# Patient Record
Sex: FEMALE | Race: BLACK OR AFRICAN AMERICAN | Employment: FULL TIME | URBAN - METROPOLITAN AREA
[De-identification: names, ages, dates, MRNs, and addresses within clinical notes are randomized per-mention and may not be internally consistent; named-entity substitution may affect disease eponyms.]

---

## 2019-09-24 ENCOUNTER — HOSPITAL ENCOUNTER (OUTPATIENT)
Dept: PHYSICAL THERAPY | Age: 59
Discharge: HOME | End: 2019-09-24
Payer: COMMERCIAL

## 2019-09-24 PROCEDURE — 97162 PT EVAL MOD COMPLEX 30 MIN: CPT

## 2019-09-24 PROCEDURE — 97110 THERAPEUTIC EXERCISES: CPT

## 2019-09-24 NOTE — PROGRESS NOTES
[address] PHYSICAL THERAPY AT [address], [address] [place] [address] Road  Phone: [phone]   [phone]  PLAN OF CARE / [address] [place] [address] PHYSICAL THERAPY SERVICES  Patient Name: Megan Story : 1960   Medical   Diagnosis: R knee pain [M25.561] Treatment Diagnosis: Knee pain, right [M25.561]   Onset Date: May 2019     Referral Source: Galilea Salgado MD Start of [facility]): 2019   Prior Hospitalization: See medical history Provider #: [id number]   Prior Level of Function: Previously able to walk w/o AD   Comorbidities: OA, BMI > 30, PVD, Sleep apnea   Medications: Verified on Patient Summary List   The Plan of Care and following information is based on the information from the initial evaluation.   ===========================================================================================  Assessment / key information:  Pt is a 64 y/o F who presents to PT w/ c/o R knee pain that began in May 2019 after pulling weeds from her garden. Pt is unsure of IVAN but feels she may have hyperextended her knee. Reports later that evening she was unable to stand up from sitting due to pain. Pt went to PCP 3 days later where x-rays showed mild OA and she was advised to take Aleve. Pt notes she was referred to PT at that time but did not attend. Reports since then, her pain has changed, c/o instability and a few instances of knee giving way. Notes dec stiffness. Pt currently ambulating w/ SPC L, reports standing tolerance of 5 minutes and walking tolerance of 15 minutes. Denies red flags. Clinical exam significant for:  OBSERVATION: Pt ambulating w/ antalgic gait w/ dec step length R. Utilizing SPC. Negotiates 4 steps w/ B HR, NR to ascend and descend. ROM: R knee AROM 8-90 (L knee 10-0-94). PROM knee flex 101 (soft tissue approximation limiting further ROM). STRENGTH: Poor quad set R (L fair+).  Knee flex 4-/5 R (4/5 L), ext R 4-/5 (L 4/5). Hip abd R 3-/5, L 3/5, hip ext 3-/5 B. PALPATION: TTP to lateral joint line and LCL, as well as patellar tendon. SPECIAL TEST: (-)varus/valgus stress testing for laxity, (+) valgus for pain at lateral joint line. (-) posterior drawer. Nataliia (-) within allowable ROM and ability to maintain LE in testing position by PT. (+) patellar compression test. FOTO 30/100. Pt presents w/ sx consistent w/ PFPS, however complaints of instability and instances of buckling do raise concern. Unfortunately, ligamentous/meniscal testing may be unreliable at this time due to difficulty performing special tests on patient. Further assessment may be warranted if PT fails to resolve symptoms.   ===========================================================================================  Eval Complexity: History MEDIUM  Complexity : 1-2 comorbidities / personal factors will impact the outcome/ POC ;  Examination  MEDIUM Complexity : 3 Standardized tests and measures addressing body structure, function, activity limitation and / or participation in recreation ; Presentation MEDIUM Complexity : Evolving with changing characteristics ;   Decision Making MEDIUM Complexity : FOTO score of 26-74; Overall Complexity MEDIUM  Problem List: pain affecting function, decrease ROM, decrease strength, impaired gait/ balance, decrease ADL/ functional abilitiies, decrease activity tolerance, decrease flexibility/ joint mobility and decrease transfer abilities   Treatment Plan may include any combination of the following: Therapeutic exercise, Therapeutic activities, Neuromuscular re-education, Physical agent/modality, Gait/balance training, Manual therapy, Patient education, Self Care training, Functional mobility training, Home safety training and Stair training  Patient / Family readiness to learn indicated by: asking questions, trying to perform skills and interest  Persons(s) to be included in education: patient (P)  Barriers to Learning/Limitations: None  Measures taken, if barriers to learning:    Patient Goal (s): \"strengthen knee and reduce or eliminate pain\"   Patient self reported health status: good  Rehabilitation Potential: fair   Short Term Goals: To be accomplished in  2  weeks:  1. Pt will be I and compliant w/ HEP for self management of sx  2. Pt will demo symmetrical step length w/ normalized gait mechanics to amb w/o AD   Long Term Goals: To be accomplished in  4-6  weeks:  1. Pt will ascend/descend 1 flight of steps w/ reciprocal pattern to improve household accessibility  2. Improve R quad/HS/glute strength by at least 1/3 grade to improve standing/walking tolerance    Frequency / Duration:   Patient to be seen  2  times per week for 4-6  weeks:  Patient / Caregiver education and instruction: exercises  Therapist Signature: Jesica Pereira PT Date: 0/96/2805   Certification Period: na Time: 4:10 PM   ===========================================================================================  I certify that the above Physical Therapy Services are being furnished while the patient is under my care. I agree with the treatment plan and certify that this therapy is necessary. Physician Signature:        Date:       Time:     Please sign and return to [facility] UNIT or you may fax the signed copy to [phone]. Thank you.

## 2019-09-24 NOTE — PROGRESS NOTES
PHYSICAL THERAPY - DAILY TREATMENT NOTE    Patient Name: Talisha Yates        Date: 2019  : 1960   yes Patient  Verified  Visit #:   1     Insurance: Payor: Myles Martins / Plan: Neeta Mcfadden 77 HMO / Product Type: HMO /      In time: 2:10 Out time: 2:50   Total Treatment Time: 40     Medicare/[facility] Time Tracking (below)   Total Timed Codes (min):  na 1:1 Treatment Time:  na     TREATMENT AREA =  Knee pain, right [M25.561]    SUBJECTIVE  Pain Level (on 0 to 10 scale):  3   10   Medication Changes/New allergies or changes in medical history, any new surgeries or procedures?    no  If yes, update Summary List   Subjective Functional Status/Changes:  []  No changes reported     See POC          OBJECTIVE      10 min Therapeutic Exercise:  [x]  See flow sheet   Rationale:      increase ROM and increase strength to improve the patients ability to ambulate     Billed With/As:   [x] TE   [] TA   [] Neuro   [] Self Care Patient Education: [x] Review HEP    [] Progressed/Changed HEP based on:   [] positioning   [] body mechanics   [] transfers   [] heat/ice application    [] other:      Other Objective/Functional Measures:    See POC     Post Treatment Pain Level (on 0 to 10) scale:   3   10     ASSESSMENT  Assessment/Changes in Function:     See POC     []  See Progress Note/Recertification   Patient will continue to benefit from skilled PT services to modify and progress therapeutic interventions, address functional mobility deficits, address ROM deficits, address strength deficits, analyze and address soft tissue restrictions, analyze and cue movement patterns, analyze and modify body mechanics/ergonomics, assess and modify postural abnormalities, address imbalance/dizziness and instruct in home and community integration to attain remaining goals.    Progress toward goals / Updated goals:  See POC     PLAN  []  Upgrade activities as tolerated yes Continue plan of care   [] Discharge due to :    []  Other:      Therapist: Genny Longoria PT    Date: 9/24/2019 Time: 4:08 PM     Future Appointments   Date Time Provider Matt Ambar   10/7/2019  5:15 PM Margilda Range, PTA [facility]   10/10/2019  4:45 PM Chito Lima., PT [facility]   10/14/2019  4:30 PM Bishop Adam, PTA [facility]   10/16/2019  3:45 PM Cindy Beaver, PT [facility]   10/22/2019  3:30 PM Chito Lima., PT [facility]   10/24/2019  4:45 PM Chito Swain, PT [facility]   10/28/2019  4:15 PM Alba Kelly, PT [facility]   10/31/2019  4:45 PM Chito Lima., PT [facility]

## 2019-10-07 ENCOUNTER — APPOINTMENT (OUTPATIENT)
Dept: PHYSICAL THERAPY | Age: 59
End: 2019-10-07
Payer: COMMERCIAL

## 2019-10-10 ENCOUNTER — APPOINTMENT (OUTPATIENT)
Dept: PHYSICAL THERAPY | Age: 59
End: 2019-10-10
Payer: COMMERCIAL

## 2019-10-14 ENCOUNTER — HOSPITAL ENCOUNTER (OUTPATIENT)
Dept: PHYSICAL THERAPY | Age: 59
Discharge: HOME | End: 2019-10-14
Payer: COMMERCIAL

## 2019-10-14 PROCEDURE — 97110 THERAPEUTIC EXERCISES: CPT

## 2019-10-14 NOTE — PROGRESS NOTES
PT DAILY TREATMENT NOTE     Patient Name: Victorino Figueredo  Date:10/14/2019  : 1960  [x]  Patient  Verified  Payor: Carlos Godwin / Plan: Neeta Mcfadden 77 HMO / Product Type: HMO /    In time:4:34  Out time:5:28  Total Treatment Time (min): 54  Total Timed Codes (min): 39  1:1 Treatment Time (min):    Visit #: 2 of     Treatment Area: Knee pain, right [M25.561]    SUBJECTIVE  Pain Level (0-10 scale): 4/10  Any medication changes, allergies to medications, adverse drug reactions, diagnosis change, or new procedure performed?: [x] No    [] Yes (see summary sheet for update)  Subjective functional status/changes:   [] No changes reported  I feel most of the pain on the outside of my knee going down to my shin especially if I do any amount of standing and it even wakes me up at night trying to find a position to get comfortable in.     OBJECTIVE  Modality rationale: decrease inflammation and decrease pain to improve the patients ability to improve functional abilities   Min Type Additional Details    [] Estim: []Att   []Unatt  []TENS instruct                 []IFC  []Premod []NMES                       []Other:  []w/US   []w/ice   []w/heat  Position:  Location:    []  Traction: [] Cervical       []Lumbar                       [] Prone          []Supine                       []Intermittent   []Continuous Lbs:  [] before manual  [] after manual    []  Ultrasound: []Continuous   [] Pulsed                           []1MHz   []3MHz Location:  W/cm2:    []  Iontophoresis with dexamethasone         Location: [] Take home patch   [] In clinic   10 [x]  Ice     []  heat  []  Ice massage Position:reclined  Location:right knee     []  Vasopneumatic Device Pressure: [] lo [] med [] hi   Temp: [] lo [] med [] hi   [] Skin assessment post-treatment:  []intact []redness- no adverse reaction       []redness - adverse reaction:       44 min Therapeutic Exercise:  [x] See flow sheet :   Rationale: increase ROM, increase strength, improve balance and increase proprioception to improve the patients ability to improve functional abilities            min Patient Education: [x] Review HEP    [] Progressed/Changed HEP based on:   [] positioning   [] body mechanics   [] transfers   [] heat/ice application        Other Objective/Functional Measures:     Pain Level (0-10 scale) post treatment: 3/10    ASSESSMENT/Changes in Function: Pt tolerated all new therex fairly well upon trial today with chief c/o lateral knee and distal LE pain with prolonged standing as well as with static positioning especially at night that interrupts her sleep. Pt needs the most focus on quadriceps strengthening and knee stabilization. Will continue to progress/advance patient within current POC as tolerated with monitoring symptoms. Patient will continue to benefit from skilled PT services to modify and progress therapeutic interventions, address functional mobility deficits, address ROM deficits, address strength deficits and analyze and cue movement patterns to attain remaining goals. []  See Plan of Care  []  See progress note/recertification  []  See Discharge Summary         Progress towards goals / Updated goals: · Short Term Goals: To be accomplished in  2  weeks:  1. Pt will be I and compliant w/ HEP for self management of sx  2. Pt will demo symmetrical step length w/ normalized gait mechanics to amb w/o AD  · Long Term Goals: To be accomplished in  4-6  weeks:  1. Pt will ascend/descend 1 flight of steps w/ reciprocal pattern to improve household accessibility  2.  Improve R quad/HS/glute strength by at least 1/3 grade to improve standing/walking tolerance       PLAN  [x]  Upgrade activities as tolerated     []  Continue plan of care  []  Update interventions per flow sheet       []  Discharge due to:_  []  Other:_      Demarcus Beckman, PTA 10/14/2019  5:14 PM      Future Appointments   Date Time Provider Matt Villalta 10/16/2019  3:45 PM Rudy Beaver, PT [facility]   10/22/2019  3:30 PM Brenda Beard, PT [facility]   10/24/2019  4:45 PM Brenda Beard, PT [facility]   10/28/2019  4:15 PM Debbie Christine, PT [facility]   10/31/2019  4:45 PM Brenda Beard, PT [facility]

## 2019-10-16 ENCOUNTER — HOSPITAL ENCOUNTER (OUTPATIENT)
Dept: PHYSICAL THERAPY | Age: 59
Discharge: HOME | End: 2019-10-16
Payer: COMMERCIAL

## 2019-10-16 PROCEDURE — 97110 THERAPEUTIC EXERCISES: CPT

## 2019-10-16 NOTE — PROGRESS NOTES
PHYSICAL THERAPY - DAILY TREATMENT NOTE    Patient Name: Radha Griggs        Date: 10/16/2019  : 1960   yes Patient  Verified  Visit #:   3     Ins[facility]: [facility] / Plan: Neeta Mcfadden 77 HMO / Product Type: HMO /      In time: 345 Out time: 4:19   Total Treatment Time: 34     Medicare/[employer] Time Tracking (below)   Total Timed Codes (min):  na 1:1 Treatment Time:  na     TREATMENT AREA =  Knee pain, right [M25.561]    SUBJECTIVE  Pain Level (on 0 to 10 scale):  4  / 10   Medication Changes/New allergies or changes in medical history, any new surgeries or procedures?    no  If yes, update Summary List   Subjective Functional Status/Changes:  []  No changes reported     Pt reports no significant soreness after last visit. \"I feel like my knee is getting a little better\".           OBJECTIVE  Modalities Rationale:     n/a to improve patient's ability to n/a- PD CP   min [] Estim, type/location:                                      []  att     []  unatt     []  w/US     []  w/ice    []  w/heat    min []  Mechanical Traction: type/lbs                   []  pro   []  sup   []  int   []  cont    []  before manual    []  after manual    min []  Ultrasound, settings/location:      min []  Iontophoresis w/ dexamethasone, location:                                               []  take home patch       []  in clinic    min []  Ice     []  Heat    location/position:     min []  Vasopneumatic Device, press/temp:     min []  Other:    [] Skin assessment post-treatment (if applicable):    []  intact    []  redness- no adverse reaction     []redness - adverse reaction:         34 min Therapeutic Exercise:  [x]  See flow sheet   Rationale:      increase ROM, increase strength, improve balance and increase proprioception to improve the patients ability to negotiate stairs, tolerate prolonged walking     Billed With/As:   [x] TE   [] TA   [] Neuro   [] Self Care Patient Education: [x] Review HEP    [] Progressed/Changed HEP based on:   [] positioning   [] body mechanics   [] transfers   [] heat/ice application    [] other:      Other Objective/Functional Measures:    Cueing to reduce genu valgum angulation w/ lateral step ups  Pt c/o lateral knee pain w/ quad sets w/ towel roll under knee  SLR flex to mid-range 2' fatigue     Post Treatment Pain Level (on 0 to 10) scale:   2  / 10     ASSESSMENT  Assessment/Changes in Function:     Pt reported dec pain post tx session. Cont to progress exercise as tolerated to improve stabilization to the R knee     []  See Progress Note/Recertification   Patient will continue to benefit from skilled PT services to modify and progress therapeutic interventions, address functional mobility deficits, address ROM deficits, address strength deficits, analyze and address soft tissue restrictions, analyze and cue movement patterns, analyze and modify body mechanics/ergonomics, assess and modify postural abnormalities and instruct in home and community integration to attain remaining goals. Progress toward goals / Updated goals: · Short Term Goals: To be accomplished in  2  weeks:  1. Pt will be I and compliant w/ HEP for self management of sx 10/16/19: goal met, pt reports compliance  2. Pt will demo symmetrical step length w/ normalized gait mechanics to amb w/o AD  · Long Term Goals: To be accomplished in  4-6  weeks:  1. Pt will ascend/descend 1 flight of steps w/ reciprocal pattern to improve household accessibility  2.  Improve R quad/HS/glute strength by at least 1/3 grade to improve standing/walking tolerance     PLAN  []  Upgrade activities as tolerated yes Continue plan of care   []  Discharge due to :    []  Other:      Therapist: Lili Foster PT    Date: 10/16/2019 Time: 4:17 PM     Future Appointments   Date Time Provider Matt Villalta   10/22/2019  6:00 PM Talib Mack, PT [facility]   10/24/2019  5:30 PM Talib Mack, PT [facility]   10/28/2019  4:15 PM Zachery Naik, PT [facility]   10/31/2019  4:45 PM Lalo Akins, PT [facility]

## 2019-10-22 ENCOUNTER — HOSPITAL ENCOUNTER (OUTPATIENT)
Dept: PHYSICAL THERAPY | Age: 59
Discharge: HOME | End: 2019-10-22
Payer: COMMERCIAL

## 2019-10-22 PROCEDURE — 97110 THERAPEUTIC EXERCISES: CPT

## 2019-10-22 NOTE — PROGRESS NOTES
[address] [place] [address] PHYSICAL THERAPY  [address], [place], [address] - Phone: [phone]  Fax: [phone]  PROGRESS NOTE  Patient Name: Jon Medel : 1960   Treatment/Medical Diagnosis: Knee pain, right [M25.561]   Referral Source: Padilla Dhillon MD     Date of Initial Visit: 19 Attended Visits: 4 Missed Visits: 0     SUMMARY OF TREATMENT  Physical therapy has consisted of therapeutic exercises for increased RLE strength, ROM, and flexibility. Ice provided PRN for palliative. Pt education provided regarding HEP. CURRENT STATUS  Pt rates overall improvement as 30-40% with functional activities since [facility]. Pt demonstrates significant objective improvement to knee strength as per increased knee ext by 4/3 MMT. Hamstrings increased 2/3 MMT from [facility]. Hip abd increased 2/3 MMT. Further progress has been somewhat limited by decreased attendance in PT sessions, only attending 4 sessions in 4 weeks (reports she was sick first 2 weeks). Current improvements: \"I feel like my knee is stronger now\". Current objective impairments:    STRENGTH:  Hip flex R 4/5, L 4/5; ext fair as per limited hip clearance with bridge; Hip abd R 3+/5. Knee Ext R 5/5, L 5/5; flex R 4+/5, L 4+/5. Right quad set fair-; submax due to pain c/o. AROM/PROM: -14 to 93/95 (p!)  Current functional limitations:  Stairs (crawls to ascend, leans on wall reciprocal descent per pt report at home), stand 5 min tolerance, walk 10 min tolerance. Goal/Measure of Progress Goal Met? 1. Pt will be I and compliant w/ HEP for self management of sx. Status at last Eval:  Current Status: Non-compliant; performs ~ 1 x/wk no   2. Pt will demo symmetrical step length w/ normalized gait mechanics to amb w/o AD   Status at last Eval: Pt ambulating w/ antalgic gait w/ dec step length R. Utilizing SPC.   Current Status: Antalgic gait pattern, decreased R step length with SPC no     New Goals to be achieved in __4__  weeks:  1. Pt will ascend/descend 1 flight of steps w/ reciprocal pattern to improve household accessibility  2. Pt will demo symmetrical step length w/ normalized gait mechanics to amb w/o AD  3. Pt will be independent/compliant with HEP to promote improved walking tolerance. RECOMMENDATIONS  Pt will benefit from skilled progression of PT at 2 x/wk for additional 4 weeks to attain LTG's. If you have any questions/comments please contact us directly at [phone]. Thank you for allowing us to assist in the care of your patient. Therapist Signature: Martinez Stapleton, PT Date: 10/22/2019     Time: 5:49 PM   NOTE TO PHYSICIAN:  PLEASE COMPLETE THE ORDERS BELOW AND FAX TO   [facility]: [phone]  If you are unable to process this request in 24 hours please contact our office: [phone]    ___ I have read the above report and request that my patient continue as recommended.   ___ I have read the above report and request that my patient continue therapy with the following changes/special instructions:_________________________________________________________   ___ I have read the above report and request that my patient be discharged from therapy.      Physician Signature:        Date:       Time:

## 2019-10-22 NOTE — PROGRESS NOTES
PHYSICAL THERAPY - DAILY TREATMENT NOTE    Patient Name: Valentina Calhoun        Date: 10/22/2019  : 1960   yes Patient  Verified  Visit #:   4     Insurance: Payor: [facility] / Plan: Neeta Mcfadden 77 HMO / Product Type: HMO /      In time: 5:38 Out time: 6:33   Total Treatment Time: 55     Medicare/[facility] Time Tracking (below)   Total Timed Codes (min):  na 1:1 Treatment Time:  na     TREATMENT AREA =  Knee pain, right [M25.561]    SUBJECTIVE  Pain Level (on 0 to 10 scale):  1  / 10   Medication Changes/New allergies or changes in medical history, any new surgeries or procedures?    no  If yes, update Summary List   Subjective Functional Status/Changes:  []  No changes reported     See PN       OBJECTIVE  Modalities Rationale:     n/a to improve patient's ability to n/a- PD CP   min [] Estim, type/location:                                      []  att     []  unatt     []  w/US     []  w/ice    []  w/heat    min []  Mechanical Traction: type/lbs                   []  pro   []  sup   []  int   []  cont    []  before manual    []  after manual    min []  Ultrasound, settings/location:      min []  Iontophoresis w/ dexamethasone, location:                                               []  take home patch       []  in clinic   10 min [x]  Ice     []  Heat    location/position: R knee; reclined sitting    min []  Vasopneumatic Device, press/temp:     min []  Other:    [] Skin assessment post-treatment (if applicable):    []  intact    []  redness- no adverse reaction     []redness - adverse reaction:         40 min Therapeutic Exercise:  [x]  See flow sheet   Rationale:      increase ROM, increase strength, improve balance and increase proprioception to improve the patients ability to negotiate stairs, tolerate prolonged walking     Billed With/As:   [x] TE   [] TA   [] Neuro   [] Self Care Patient Education: [x] Review HEP    [] Progressed/Changed HEP based on:   [] positioning [] body mechanics   [] transfers   [] heat/ice application    [] other:      Other Objective/Functional Measures:    -progressed to 2 lb LAQ  -attempted manual stretch R quad in L s/l however pt c/o pain   Post Treatment Pain Level (on 0 to 10) scale:   2  / 10     ASSESSMENT  Assessment/Changes in Function:     See PN     []  See Progress Note/Recertification   Patient will continue to benefit from skilled PT services to modify and progress therapeutic interventions, address functional mobility deficits, address ROM deficits, address strength deficits, analyze and address soft tissue restrictions, analyze and cue movement patterns, analyze and modify body mechanics/ergonomics, assess and modify postural abnormalities and instruct in home and community integration to attain remaining goals. Progress toward goals / Updated goals:  See pn     PLAN  []  Upgrade activities as tolerated yes Continue plan of care   []  Discharge due to :    []  Other:      Therapist: Shayna Bright.  Philomena Awad, PT    Date: 10/22/2019 Time: 7:15 PM      Future Appointments   Date Time Provider Matt Villalta   10/22/2019  6:00 PM Leodan Shaw, PT [facility]   10/24/2019  5:30 PM Leodan Shaw, PT [facility]   10/28/2019  4:15 PM Inessa Guthrie PT [facility]   10/31/2019  4:45 PM Leodan Shaw, PT [facility]

## 2019-10-24 ENCOUNTER — HOSPITAL ENCOUNTER (OUTPATIENT)
Dept: PHYSICAL THERAPY | Age: 59
Discharge: HOME | End: 2019-10-24
Payer: COMMERCIAL

## 2019-10-24 PROCEDURE — 97110 THERAPEUTIC EXERCISES: CPT

## 2019-10-24 NOTE — PROGRESS NOTES
PHYSICAL THERAPY - DAILY TREATMENT NOTE    Patient Name: Bernard Mitchell        Date: 10/24/2019  : 1960   yes Patient  Verified  Visit #:   5   of     Insurance: Payor: Radha Bach / Plan: Neeta Mcfadden 77 HMO / Product Type: HMO /      In time: 5:29 Out time: 6:15   Total Treatment Time: 46     Medicare/[facility] Time Tracking (below)   Total Timed Codes (min):  na 1:1 Treatment Time:  na     TREATMENT AREA =  Knee pain, right [M25.561]    SUBJECTIVE  Pain Level (on 0 to 10 scale):  1  / 10   Medication Changes/New allergies or changes in medical history, any new surgeries or procedures?    no  If yes, update Summary List   Subjective Functional Status/Changes:  []  No changes reported     \"I was able to walk without the cane today, so that's a good day\"       OBJECTIVE  Modalities Rationale:     n/a to improve patient's ability to n/a- PD CP   min [] Estim, type/location:                                      []  att     []  unatt     []  w/US     []  w/ice    []  w/heat    min []  Mechanical Traction: type/lbs                   []  pro   []  sup   []  int   []  cont    []  before manual    []  after manual    min []  Ultrasound, settings/location:      min []  Iontophoresis w/ dexamethasone, location:                                               []  take home patch       []  in clinic   10 min [x]  Ice     []  Heat    location/position: R knee; reclined sitting    min []  Vasopneumatic Device, press/temp:     min []  Other:    [] Skin assessment post-treatment (if applicable):    []  intact    []  redness- no adverse reaction     []redness - adverse reaction:         31 min Therapeutic Exercise:  [x]  See flow sheet (-5 min TM)   Rationale:      increase ROM, increase strength, improve balance and increase proprioception to improve the patients ability to negotiate stairs, tolerate prolonged walking     Billed With/As:   [x] TE   [] TA   [] Neuro   [] Self Care Patient Education: [x] Review HEP    [] Progressed/Changed HEP based on:   [] positioning   [] body mechanics   [] transfers   [] heat/ice application    [] other:      Other Objective/Functional Measures:    -added fwd step ups  -no towel roll for QS today  -increased reps with bridges     Post Treatment Pain Level (on 0 to 10) scale:   2.5  / 10     ASSESSMENT  Assessment/Changes in Function:     Pt with less irritation with QS today; not requiring towel roll . Able to advance with quad strengthening with addition of fwd step ups. []  See Progress Note/Recertification   Patient will continue to benefit from skilled PT services to modify and progress therapeutic interventions, address functional mobility deficits, address ROM deficits, address strength deficits, analyze and address soft tissue restrictions, analyze and cue movement patterns, analyze and modify body mechanics/ergonomics, assess and modify postural abnormalities and instruct in home and community integration to attain remaining goals. Progress toward goals / Updated goals:    No significant changes yet from PN completed last session     PLAN  []  Upgrade activities as tolerated yes Continue plan of care   []  Discharge due to :    []  Other:      Therapist: Baljit Aviles.  Jessika Bronson, PT    Date: 10/24/2019 Time: 6:23 PM      Future Appointments   Date Time Provider Matt Villalta   10/24/2019  5:30 PM Carlos Goetz, PT [facility]   10/28/2019  4:15 PM Nighat Zurita PT [facility]   10/31/2019  4:45 PM Carlos Goetz PT [facility]

## 2019-10-28 ENCOUNTER — HOSPITAL ENCOUNTER (OUTPATIENT)
Dept: PHYSICAL THERAPY | Age: 59
Discharge: HOME | End: 2019-10-28
Payer: COMMERCIAL

## 2019-10-28 PROCEDURE — 97110 THERAPEUTIC EXERCISES: CPT

## 2019-10-28 NOTE — PROGRESS NOTES
PHYSICAL THERAPY - DAILY TREATMENT NOTE    Patient Name: Ewa Gr        Date: 10/28/2019  : 1960   yes Patient  Verified  Visit #:   6     Insurance: Payor: Leonora Pimentel / Plan: Neeta Mcfadden 77 HMO / Product Type: HMO /      In time: 4:17 Out time: 5:07   Total Treatment Time: 50     Medicare/[employer] Time Tracking (below)   Total Timed Codes (min):  na 1:1 Treatment Time:  na     TREATMENT AREA =  Knee pain, right [M25.561]    SUBJECTIVE  Pain Level (on 0 to 10 scale):  1  / 10   Medication Changes/New allergies or changes in medical history, any new surgeries or procedures?    no  If yes, update Summary List   Subjective Functional Status/Changes:  []  No changes reported     \"I was able to help someone carry something out to the car today with my cane, about 100'\". Pt reports inc frequency of being able to walk w/o cane          OBJECTIVE  Modalities Rationale:     decrease inflammation and decrease pain to improve patient's ability to ambulate   min [] Estim, type/location:                                      []  att     []  unatt     []  w/US     []  w/ice    []  w/heat    min []  Mechanical Traction: type/lbs                   []  pro   []  sup   []  int   []  cont    []  before manual    []  after manual    min []  Ultrasound, settings/location:      min []  Iontophoresis w/ dexamethasone, location:                                               []  take home patch       []  in clinic   10 min [x]  Ice     []  Heat    location/position:  To the R knee in long sit    min []  Vasopneumatic Device, press/temp:     min []  Other:    [] Skin assessment post-treatment (if applicable):    []  intact    []  redness- no adverse reaction     []redness - adverse reaction:        40 min Therapeutic Exercise:  [x]  See flow sheet   Rationale:      increase ROM, increase strength, improve balance and increase proprioception to improve the patients ability to ambulate, transfer Billed With/As:   [x] TE   [] TA   [] Neuro   [] Self Care Patient Education: [x] Review HEP    [] Progressed/Changed HEP based on:   [] positioning   [] body mechanics   [] transfers   [] heat/ice application    [] other:      Other Objective/Functional Measures:    Progressed therex per flow to improve R LE strength/stability  Pt challenged with lateral tap downs w/ eccentric control but denied inc in pain  C/o feeling of patella popping w/ LAQ but reduced w/ slow, purposeful movement and attention to form     Post Treatment Pain Level (on 0 to 10) scale:   2  / 10     ASSESSMENT  Assessment/Changes in Function:     Pt cont w/ dec R knee stability In closed chain activity, could benefit from continued progression of therex to address     []  See Progress Note/Recertification   Patient will continue to benefit from skilled PT services to modify and progress therapeutic interventions, address functional mobility deficits, address ROM deficits, address strength deficits, analyze and address soft tissue restrictions, analyze and cue movement patterns, analyze and modify body mechanics/ergonomics, assess and modify postural abnormalities, address imbalance/dizziness and instruct in home and community integration to attain remaining goals. Progress toward goals / Updated goals:    1. Pt will ascend/descend 1 flight of steps w/ reciprocal pattern to improve household accessibility 10/28/19: added lateral tap down to address this goal; initiated progress  2. Pt will demo symmetrical step length w/ normalized gait mechanics to amb w/o AD  3. Pt will be independent/compliant with HEP to promote improved walking tolerance.      PLAN  []  Upgrade activities as tolerated yes Continue plan of care   []  Discharge due to :    []  Other:      Therapist: Brodie Fajardo PT    Date: 10/28/2019 Time: 4:26 PM     Future Appointments   Date Time Provider Matt Villalta   10/31/2019  4:45 PM Norva Holstein., PT [facility]

## 2019-10-31 ENCOUNTER — HOSPITAL ENCOUNTER (OUTPATIENT)
Dept: PHYSICAL THERAPY | Age: 59
Discharge: HOME | End: 2019-10-31
Payer: COMMERCIAL

## 2019-10-31 PROCEDURE — 97110 THERAPEUTIC EXERCISES: CPT

## 2019-10-31 NOTE — PROGRESS NOTES
PHYSICAL THERAPY - DAILY TREATMENT NOTE    Patient Name: Alisia Nation        Date: 10/31/2019  : 1960   yes Patient  Verified  Visit #:     Insurance: Payor: Tundey Emigdio / Plan: Neeta Mcfadden 77 HMO / Product Type: HMO /      In time: 4:45 PM Out time: 5:45   Total Treatment Time: 60     Medicare/[facility] Time Tracking (below)   Total Timed Codes (min):  na 1:1 Treatment Time:  na     TREATMENT AREA =  Knee pain, right [M25.561]    SUBJECTIVE  Pain Level (on 0 to 10 scale):  2  / 10   Medication Changes/New allergies or changes in medical history, any new surgeries or procedures?    no  If yes, update Summary List   Subjective Functional Status/Changes:  []  No changes reported     Pt states she is able to get her shoes tied easier now on her left foot; still a little more difficult on her Right. OBJECTIVE  Modalities Rationale:     decrease inflammation and decrease pain to improve patient's ability to ambulate   min [] Estim, type/location:                                      []  att     []  unatt     []  w/US     []  w/ice    []  w/heat    min []  Mechanical Traction: type/lbs                   []  pro   []  sup   []  int   []  cont    []  before manual    []  after manual    min []  Ultrasound, settings/location:      min []  Iontophoresis w/ dexamethasone, location:                                               []  take home patch       []  in clinic   10 min [x]  Ice     []  Heat    location/position:  To the R knee in long sit    min []  Vasopneumatic Device, press/temp:     min []  Other:    [] Skin assessment post-treatment (if applicable):    []  intact    []  redness- no adverse reaction     []redness - adverse reaction:        45 min Therapeutic Exercise:  [x]  See flow sheet (-5 min TM warm up)   Rationale:      increase ROM, increase strength, improve balance and increase proprioception to improve the patients ability to ambulate, transfer     Billed With/As:   [x] TE   [] TA   [] Neuro   [] Self Care Patient Education: [x] Review HEP    [] Progressed/Changed HEP based on:   [] positioning   [] body mechanics   [] transfers   [] heat/ice application    [] other:      Other Objective/Functional Measures:    -increased to RTB with clams  -requires manual and verbal cues for QS today     Post Treatment Pain Level (on 0 to 10) scale:   2  / 10     ASSESSMENT  Assessment/Changes in Function:     Pt with impaired QS today requiring increased cueing. Pt continues to demonstrate and report fatigue with reps indicating appropriate intensity. Will plan on advancing resistance to LAQ next visit for ongoing strengthening. []  See Progress Note/Recertification   Patient will continue to benefit from skilled PT services to modify and progress therapeutic interventions, address functional mobility deficits, address ROM deficits, address strength deficits, analyze and address soft tissue restrictions, analyze and cue movement patterns, analyze and modify body mechanics/ergonomics, assess and modify postural abnormalities, address imbalance/dizziness and instruct in home and community integration to attain remaining goals. Progress toward goals / Updated goals:    1. Pt will ascend/descend 1 flight of steps w/ reciprocal pattern to improve household accessibility 10/28/19: added lateral tap down to address this goal; initiated progress  2. Pt will demo symmetrical step length w/ normalized gait mechanics to amb w/o AD  3. Pt will be independent/compliant with HEP to promote improved walking tolerance. PLAN  []  Upgrade activities as tolerated yes Continue plan of care   []  Discharge due to :    []  Other:      Therapist: Dana Monroe.  Adin Contreras PT    Date: 10/31/2019 Time: 6:05 PM      Future Appointments   Date Time Provider Matt Villalta   11/12/2019  2:00 PM Deshawn Mclaughlin [facility]   11/14/2019 12:00 PM Olimpia Galindo, PT [facility]   11/18/2019 3:00 PM Viona Shown [facility] [address]   11/20/2019  3:30 PM Viona Shown [facility] [address]   11/26/2019  4:15 PM Jodi Jones PTA [facility] [address]   11/27/2019  4:15 PM Jodi Jones PTA [facility] [address]

## 2019-11-12 ENCOUNTER — HOSPITAL ENCOUNTER (OUTPATIENT)
Dept: PHYSICAL THERAPY | Age: 59
Discharge: HOME | End: 2019-11-12
Payer: COMMERCIAL

## 2019-11-12 PROCEDURE — 97110 THERAPEUTIC EXERCISES: CPT

## 2019-11-12 NOTE — PROGRESS NOTES
PT DAILY TREATMENT NOTE     Patient Name: Dora Salinas  Date:2019  : 1960  [x]  Patient  Verified  Payor: Teddy Vale / Plan: [employer] HMO / Product Type: HMO /    In time:2:03  Out time:3:04  Total Treatment Time (min): 61  Total Timed Codes (min): 46  1:1 Treatment Time (min):    Visit #: 8 of     Treatment Area: Knee pain, right [M25.561]    SUBJECTIVE  Pain Level (0-10 scale): 3/10   Any medication changes, allergies to medications, adverse drug reactions, diagnosis change, or new procedure performed?: [x] No    [] Yes (see summary sheet for update)  Subjective functional status/changes:   [] No changes reported  My knee is hurting more than usual today, but I don't remember doing anything different that would have flared it up, I think that it's just the weather today.     OBJECTIVE  Modality rationale: decrease inflammation and decrease pain to improve the patients ability to improve functional abilities   Min Type Additional Details    [] Estim: []Att   []Unatt  []TENS instruct                 []IFC  []Premod []NMES                       []Other:  []w/US   []w/ice   []w/heat  Position:  Location:    []  Traction: [] Cervical       []Lumbar                       [] Prone          []Supine                       []Intermittent   []Continuous Lbs:  [] before manual  [] after manual    []  Ultrasound: []Continuous   [] Pulsed                           []1MHz   []3MHz Location:  W/cm2:    []  Iontophoresis with dexamethasone         Location: [] Take home patch   [] In clinic   10 [x]  Ice     []  heat  []  Ice massage Position:long sitting   Location:right knee    []  Vasopneumatic Device Pressure: [] lo [] med [] hi   Temp: [] lo [] med [] hi   [] Skin assessment post-treatment:  []intact []redness- no adverse reaction       []redness - adverse reaction:       51 min Therapeutic Exercise:  [x] See flow sheet :   Rationale: increase ROM and increase strength to improve the patients ability to improve functional abilities      min Patient Education: [x] Review HEP    [] Progressed/Changed HEP based on:   [] positioning   [] body mechanics   [] transfers   [] heat/ice application        Other Objective/Functional Measures:     Pain Level (0-10 scale) post treatment: 2/10    ASSESSMENT/Changes in Function: Pt presents with the most significant improvement with improved ambulation endurance with straight cane over the past 2 visits. Pt was also able to increase from 4 to 5 lbs with LAQ's with min to mod challenge today. Will continue to progress/advance patient within current POC as tolerated with monitoring symptoms. Patient will continue to benefit from skilled PT services to modify and progress therapeutic interventions, address functional mobility deficits, address ROM deficits, address strength deficits, analyze and cue movement patterns, analyze and modify body mechanics/ergonomics and assess and modify postural abnormalities to attain remaining goals.      []  See Plan of Care  []  See progress note/recertification  []  See Discharge Summary         Progress towards goals / Updated goals:      PLAN  [x]  Upgrade activities as tolerated     []  Continue plan of care  []  Update interventions per flow sheet       []  Discharge due to:_  []  Other:_      Demarcus Beckman PTA 11/12/2019  2:05 PM      Future Appointments   Date Time Provider Matt Villalta   11/14/2019 12:00 PM Uriel Harden, PT [facility] [address]   11/18/2019  3:00 PM Jt Okeefe PTA [facility] [address]   11/20/2019  3:30 PM Jt Okeefe PTA [facility] [address]   11/26/2019  4:15 PM Jt Okeefe PTA [facility] [address]   11/27/2019  4:15 PM Jt Okeefe PTA [facility] [address]

## 2019-11-14 ENCOUNTER — HOSPITAL ENCOUNTER (OUTPATIENT)
Dept: PHYSICAL THERAPY | Age: 59
Discharge: HOME | End: 2019-11-14
Payer: COMMERCIAL

## 2019-11-14 PROCEDURE — 97110 THERAPEUTIC EXERCISES: CPT

## 2019-11-14 NOTE — PROGRESS NOTES
PHYSICAL THERAPY - DAILY TREATMENT NOTE    Patient Name: Domo Balbuena        Date: 2019  : 1960   yes Patient  Verified  Visit #:     Insurance: Payor: Kami Lucio / Plan: Neeta Mcfadden 77 HMO / Product Type: HMO /      In time: 12:06 PM Out time: 1:06   Total Treatment Time: 60     Medicare/[employer] Time Tracking (below)   Total Timed Codes (min):  n/a 1:1 Treatment Time:  n/a     TREATMENT AREA =  Knee pain, right [M25.561]    SUBJECTIVE  Pain Level (on 0 to 10 scale):  2  / 10   Medication Changes/New allergies or changes in medical history, any new surgeries or procedures?    no  If yes, update Summary List   Subjective Functional Status/Changes:  []  No changes reported     \"I think the therapy's been helping and I need to keep doing it; I think I'm a little slower getting better because I'm fighting a lot of things like this swelling\".     Performing clams, SLR, LAQ, GS, heel slides         OBJECTIVE  Modalities Rationale:    decrease inflammation and decrease pain to improve the patients ability to improve functional abilities   min [] Estim, type/location:                                      []  att     []  unatt     []  w/US     []  w/ice    []  w/heat    min []  Mechanical Traction: type/lbs                   []  pro   []  sup   []  int   []  cont    []  before manual    []  after manual    min []  Ultrasound, settings/location:      min []  Iontophoresis w/ dexamethasone, location:                                               []  take home patch       []  in clinic   10 min [x]  Ice     []  Heat    location/position: Long sitting to R knee    min []  Vasopneumatic Device, press/temp:     min []  Other:    [] Skin assessment post-treatment (if applicable):    []  intact    []  redness- no adverse reaction     []redness - adverse reaction:        46 min Therapeutic Exercise:  [x]  See flow sheet   Rationale:     increase ROM and increase strength to improve the patients ability to improve functional abilities      Billed With/As:   [x] TE   [] TA   [] Neuro   [] Self Care Patient Education: [x] Review HEP    [] Progressed/Changed HEP based on:   [] positioning   [] body mechanics   [] transfers   [] heat/ice application    [x] other: updated HEP handout (see chart copy)     Other Objective/Functional Measures:    -cues to maintain erect standing posture with standing hip abd; able to increase reps as per flowsheet     Post Treatment Pain Level (on 0 to 10) scale:   2  / 10     ASSESSMENT  Assessment/Changes in Function:     Improving quad/psoas strength as per increasing height with SLR flexion, increased reps with standing hip abd. Pt with hemipelvic drop noted during lateral tap downs indicating core/hip weakness. Continues to be limited by anterior knee pain occasionally especially with knee flexion. Continue progressing exercises to promote LTG attainment. []  See Progress Note/Recertification   Patient will continue to benefit from skilled PT services to modify and progress therapeutic interventions, address functional mobility deficits, address ROM deficits, address strength deficits, analyze and cue movement patterns, analyze and modify body mechanics/ergonomics and assess and modify postural abnormalities to attain remaining goals. Progress toward goals / Updated goals:    1. Pt will ascend/descend 1 flight of steps w/ reciprocal pattern to improve household accessibility 11/14: slow progress; continues to be challenged with 4\" lateral tap down  2. Pt will demo symmetrical step length w/ normalized gait mechanics to amb w/o AD  3. Pt will be independent/compliant with HEP to promote improved walking tolerance. PLAN  []  Upgrade activities as tolerated yes Continue plan of care   []  Discharge due to :    []  Other:      Therapist: Kathy Andersen.  Mercie Dubin, PT    Date: 11/14/2019 Time: 9:42 AM     Future Appointments   Date Time Provider Department [place]   11/14/2019 12:00 PM Olimpia Galindo, PT [facility]   11/18/2019  3:00 PM Deshawn Mclaughlin [facility]   11/20/2019  3:30 PM Sunshine Che, STEVE [facility]   11/26/2019  4:15 PM Sunshine Che PTA [facility]   11/27/2019  4:15 PM Sunshine Che PTA [facility]

## 2019-11-18 ENCOUNTER — HOSPITAL ENCOUNTER (OUTPATIENT)
Dept: PHYSICAL THERAPY | Age: 59
Discharge: HOME | End: 2019-11-18
Payer: COMMERCIAL

## 2019-11-18 PROCEDURE — 97110 THERAPEUTIC EXERCISES: CPT

## 2019-11-18 NOTE — PROGRESS NOTES
PT DAILY TREATMENT NOTE     Patient Name: Liea Trejo  Date:2019  : 1960  [x]  Patient  Verified  Payor: Rachelle Whitmore / Plan: [employer] HMO / Product Type: HMO /    In time:3:12  Out time:4:18  Total Treatment Time (min): 66  Total Timed Codes (min): 51  1:1 Treatment Time (min):    Visit #: 10 of 12    Treatment Area: Knee pain, right [M25.561]    SUBJECTIVE  Pain Level (0-10 scale): 1/10  Any medication changes, allergies to medications, adverse drug reactions, diagnosis change, or new procedure performed?: [x] No    [] Yes (see summary sheet for update)  Subjective functional status/changes:   [] No changes reported  My knee feels a lot better than it did the last time that I was here, but I did twist it yesterday when I was stepping backwards when I was cleaning my bathroom.     OBJECTIVE  Modality rationale: decrease inflammation and decrease pain to improve the patients ability to improve functional abilities   Min Type Additional Details    [] Estim: []Att   []Unatt  []TENS instruct                 []IFC  []Premod []NMES                       []Other:  []w/US   []w/ice   []w/heat  Position:  Location:    []  Traction: [] Cervical       []Lumbar                       [] Prone          []Supine                       []Intermittent   []Continuous Lbs:  [] before manual  [] after manual    []  Ultrasound: []Continuous   [] Pulsed                           []1MHz   []3MHz Location:  W/cm2:    []  Iontophoresis with dexamethasone         Location: [] Take home patch   [] In clinic   10 [x]  Ice     []  heat  []  Ice massage Position:long sitting   Location:right knee    []  Vasopneumatic Device Pressure: [] lo [] med [] hi   Temp: [] lo [] med [] hi   [] Skin assessment post-treatment:  []intact []redness- no adverse reaction       []redness - adverse reaction:       56 min Therapeutic Exercise:  [x] See flow sheet :   Rationale: increase ROM, increase strength, improve balance and increase proprioception to improve the patients ability to improve functional abilities         min Patient Education: [x] Review HEP    [] Progressed/Changed HEP based on:   [] positioning   [] body mechanics   [] transfers   [] heat/ice application        Other Objective/Functional Measures:     Pain Level (0-10 scale) post treatment: 1/10    ASSESSMENT/Changes in Function: Pt was able to advance from 4\" to 6\" step with forward and lateral step ups with moderate challenge, but was unable to increase step height with lateral tap downs due to increased pain/decreased control upon trial today. Will continue to progress/advance patient within current POC as tolerated with monitoring symptoms. Patient will continue to benefit from skilled PT services to modify and progress therapeutic interventions, address functional mobility deficits, address ROM deficits, address strength deficits and analyze and cue movement patterns to attain remaining goals. []  See Plan of Care  []  See progress note/recertification  []  See Discharge Summary         Progress towards goals / Updated goals:  1. Pt will ascend/descend 1 flight of steps w/ reciprocal pattern to improve household accessibility 11/14: slow progress; continues to be challenged with 4\" lateral tap down  2. Pt will demo symmetrical step length w/ normalized gait mechanics to amb w/o AD  3. Pt will be independent/compliant with HEP to promote improved walking tolerance.     PLAN  [x]  Upgrade activities as tolerated     []  Continue plan of care  []  Update interventions per flow sheet       []  Discharge due to:_  []  Other:_      Leslie Garnett PTA 11/18/2019  3:14 PM      Future Appointments   Date Time Provider Matt Villalta   11/20/2019  3:30 PM Brandon Menendez [facility]   11/26/2019  4:15 PM Jesika Yoder PTA [facility]   11/27/2019  4:15 PM Jesika Yoder PTA [facility]

## 2019-11-20 ENCOUNTER — HOSPITAL ENCOUNTER (OUTPATIENT)
Dept: PHYSICAL THERAPY | Age: 59
Discharge: HOME | End: 2019-11-20
Payer: COMMERCIAL

## 2019-11-20 PROCEDURE — 97110 THERAPEUTIC EXERCISES: CPT

## 2019-11-20 NOTE — PROGRESS NOTES
PT DAILY TREATMENT NOTE 8    Patient Name: Dilcia Flores  Date:2019  : 1960  [x]  Patient  Verified  Payor: Nikki Malloy / Plan: Carry Romulo Mccall [employer] HMO / Product Type: HMO /    In time:3:38  Out time:4:49  Total Treatment Time (min): 71  Total Timed Codes (min): 56  1:1 Treatment Time (min):    Visit #: 11 of 12    Treatment Area: Knee pain, right [M25.561]    SUBJECTIVE  Pain Level (0-10 scale): 1/10  Any medication changes, allergies to medications, adverse drug reactions, diagnosis change, or new procedure performed?: [x] No    [] Yes (see summary sheet for update)  Subjective functional status/changes:   [] No changes reported  My knee doesn't feel too bad today, my back is actually hurting more than my knee today for some reason.     OBJECTIVE  Modality rationale: decrease inflammation and decrease pain to improve the patients ability to improve functional abilities    Min Type Additional Details    [] Estim: []Att   []Unatt  []TENS instruct                 []IFC  []Premod []NMES                       []Other:  []w/US   []w/ice   []w/heat  Position:  Location:    []  Traction: [] Cervical       []Lumbar                       [] Prone          []Supine                       []Intermittent   []Continuous Lbs:  [] before manual  [] after manual    []  Ultrasound: []Continuous   [] Pulsed                           []1MHz   []3MHz Location:  W/cm2:    []  Iontophoresis with dexamethasone         Location: [] Take home patch   [] In clinic   10 [x]  Ice     []  heat  []  Ice massage Position:long sitting   Location:right knee     []  Vasopneumatic Device Pressure: [] lo [] med [] hi   Temp: [] lo [] med [] hi   [] Skin assessment post-treatment:  []intact []redness- no adverse reaction       []redness - adverse reaction:       61 min Therapeutic Exercise:  [x] See flow sheet :   Rationale: increase ROM, increase strength, improve balance and increase proprioception to improve the patients ability to improve functional abilities       min Patient Education: [x] Review HEP    [] Progressed/Changed HEP based on:   [] positioning   [] body mechanics   [] transfers   [] heat/ice application        Other Objective/Functional Measures:     Pain Level (0-10 scale) post treatment: 3/10    ASSESSMENT/Changes in Function: Pt presented with mod to max challenge with increasing to 1 lb with SLR's today. Pt is near maximum medical benefit/potential from current POC and should be appropriate for discharge next treatment as planned. Will review detailed progress/LTGs for planned discharge next visit. Patient will continue to benefit from skilled PT services to modify and progress therapeutic interventions, address functional mobility deficits, address ROM deficits, address strength deficits, analyze and cue movement patterns, analyze and modify body mechanics/ergonomics and assess and modify postural abnormalities to attain remaining goals. []  See Plan of Care  []  See progress note/recertification  []  See Discharge Summary         Progress towards goals / Updated goals:  1. Pt will ascend/descend 1 flight of steps w/ reciprocal pattern to improve household accessibility 11/14: slow progress; continues to be challenged with 4\" lateral tap down  2. Pt will demo symmetrical step length w/ normalized gait mechanics to amb w/o AD  3. Pt will be independent/compliant with HEP to promote improved walking tolerance.     PLAN  [x]  Upgrade activities as tolerated     []  Continue plan of care  []  Update interventions per flow sheet       []  Discharge due to:_  []  Other:_      Wendi Eng PTA 11/20/2019  3:43 PM      Future Appointments   Date Time Provider Matt Villalta   11/26/2019  4:15 PM [facility]   11/27/2019  4:15 PM Judi Hernández [facility]

## 2019-11-26 ENCOUNTER — HOSPITAL ENCOUNTER (OUTPATIENT)
Dept: PHYSICAL THERAPY | Age: 59
Discharge: HOME | End: 2019-11-26
Payer: COMMERCIAL

## 2019-11-26 PROCEDURE — 97110 THERAPEUTIC EXERCISES: CPT

## 2019-11-26 NOTE — PROGRESS NOTES
[address] [place] [address], [place], [address] [place] [address] Road  Phone: [phone]   [phone]  DISCHARGE SUMMARY  Patient Name: Radha Griggs : 1960   Treatment/Medical Diagnosis: Knee pain, right [M25.561]   Referral Source: Jose German MD     Date of Initial Visit: 19 Attended Visits: 12 Missed Visits: 0     SUMMARY OF TREATMENT  Physical therapy has consisted of therapeutic exercises for increased RLE strength, ROM, and flexibility. Ice provided PRN for palliative. Pt education provided regarding HEP. CURRENT STATUS  Patient reports approximately 10% overall improvement from therapy since initial evaluation with 2/10 pain level on average, increased to 4-5/10 at the worst with prolonged standing/walking ADL's as well as initial activity after prolonged sitting. Pt has made slow but steady progress with gaining knee mobility as well as advancing with knee stability and strengthening with current POC. Patient has achieved maximum medical benefit/potential from current POC after completing 12 of 12 prescribed visits and is ready for discharge from therapy at this time. Pt was given and instructed in an updated HEP for continued self maintenance of reduced symptoms after D/C from therapy. Right knee AROM= -11-92 degrees measured in supine  Right knee strength measurements/MMT= Quads=4+/5; Hamstrings=5/5  Goal/Measure of Progress Goal Met? 1. Pt will ascend/descend 1 flight of steps w/ reciprocal pattern to improve household accessibility   Status at last Eval: Progressing  Current Status: Not Met, still has to ascend/descend 1 step at a time to maintain form/control no   2.   Pt will demo symmetrical step length w/ normalized gait mechanics to amb w/o AD   Status at last Eval: Antalgic gait pattern, decreased R step length with SPC Current Status: Improved, but Pt still presents with a mild antalgic gait pattern, decreased R step length with SPC no   3. Pt will be independent/compliant with HEP to promote improved walking tolerance. Status at last Eval: Non-compliant; performs ~ 1 x/wk Current Status: Met, with updated HEP issued today yes   RECOMMENDATIONS  Patient has achieved maximum medical benefit/potential from current POC after completing 12 of 12 prescribed visits and is ready for discharge from therapy at this time. If you have any questions/comments please contact us directly at [phone]. Thank you for allowing us to assist in the care of your patient.     LPTA Signature: Heidi Gomez PTA Date: 11/26/19   Therapist Signature: All Che PT, DPT, CMTPT Time: 3:26 PM

## 2019-11-26 NOTE — PROGRESS NOTES
PT DAILY TREATMENT NOTE 8-    Patient Name: Babatunde Leal  Date:2019  : 1960  [x]  Patient  Verified  Payor: Gaviota Healy / Plan: Neeta Mcfadden [employer] HMO / Product Type: HMO /    In time:4:21  Out time:5:32  Total Treatment Time (min): 71  Total Timed Codes (min): 66  1:1 Treatment Time (min):    Visit #: 12 of 12    Treatment Area: Knee pain, right [M25.561]    SUBJECTIVE  Pain Level (0-10 scale): 3/10  Any medication changes, allergies to medications, adverse drug reactions, diagnosis change, or new procedure performed?: [x] No    [] Yes (see summary sheet for update)  Subjective functional status/changes:   [] No changes reported  My knee is hurting a little bit more than usual, but I have been on my feet a lot today doing a lot of walking. OBJECTIVE      71 min Therapeutic Exercise:  [x] See flow sheet :   Rationale: increase ROM, increase strength, improve balance and increase proprioception to improve the patients ability to improve functional abilities     min Patient Education: [x] Review HEP    [] Progressed/Changed HEP based on:   [] positioning   [] body mechanics   [] transfers   [] heat/ice application        Other Objective/Functional Measures:     Pain Level (0-10 scale) post treatment: 0    ASSESSMENT/Changes in Function:   []  See Plan of Care  []  See progress note/recertification  [x]  See Discharge Summary         Progress towards goals / Updated goals:  See discharge summary for full final detailed progress towards all established goals. PLAN  []  Upgrade activities as tolerated     []  Continue plan of care  []  Update interventions per flow sheet       [x]  Discharge due to:Patient has achieved maximum medical benefit/potential from current POC after completing 12 of 12 prescribed visits and is ready for discharge from therapy at this time.   []  Other:_      Mart Para, PTA 2019  3:08 PM      Future Appointments   Date Time Provider Matt Villalta   11/26/2019  4:15 PM Jennifer Mccrary PTA [facility]   11/27/2019  4:15 PM Jennifer Mccrary PTA [facility]

## 2019-11-27 ENCOUNTER — APPOINTMENT (OUTPATIENT)
Dept: PHYSICAL THERAPY | Age: 59
End: 2019-11-27
Payer: COMMERCIAL